# Patient Record
Sex: FEMALE | Race: WHITE | Employment: FULL TIME | URBAN - METROPOLITAN AREA
[De-identification: names, ages, dates, MRNs, and addresses within clinical notes are randomized per-mention and may not be internally consistent; named-entity substitution may affect disease eponyms.]

---

## 2024-09-27 ENCOUNTER — APPOINTMENT (OUTPATIENT)
Dept: GENERAL RADIOLOGY | Facility: HOSPITAL | Age: 29
End: 2024-09-27
Attending: EMERGENCY MEDICINE

## 2024-09-27 ENCOUNTER — HOSPITAL ENCOUNTER (EMERGENCY)
Facility: HOSPITAL | Age: 29
Discharge: HOME | End: 2024-09-28
Attending: EMERGENCY MEDICINE

## 2024-09-27 VITALS
RESPIRATION RATE: 18 BRPM | TEMPERATURE: 98 F | SYSTOLIC BLOOD PRESSURE: 101 MMHG | HEART RATE: 82 BPM | DIASTOLIC BLOOD PRESSURE: 69 MMHG | OXYGEN SATURATION: 99 % | WEIGHT: 141.13 LBS

## 2024-09-27 DIAGNOSIS — S89.92XA LEFT KNEE INJURY, INITIAL ENCOUNTER: Primary | ICD-10-CM

## 2024-09-27 PROCEDURE — 99284 EMERGENCY DEPT VISIT MOD MDM: CPT

## 2024-09-27 PROCEDURE — 99283 EMERGENCY DEPT VISIT LOW MDM: CPT

## 2024-09-27 PROCEDURE — 73560 X-RAY EXAM OF KNEE 1 OR 2: CPT | Performed by: EMERGENCY MEDICINE

## 2024-09-27 RX ORDER — KETOROLAC TROMETHAMINE 30 MG/ML
30 INJECTION, SOLUTION INTRAMUSCULAR; INTRAVENOUS ONCE
Status: DISCONTINUED | OUTPATIENT
Start: 2024-09-27 | End: 2024-09-28

## 2024-09-27 RX ORDER — KETOROLAC TROMETHAMINE 10 MG/1
10 TABLET, FILM COATED ORAL EVERY 6 HOURS PRN
Qty: 20 TABLET | Refills: 0 | Status: SHIPPED | OUTPATIENT
Start: 2024-09-27 | End: 2024-10-02

## 2024-09-27 RX ORDER — LIDOCAINE 50 MG/G
1 PATCH TOPICAL EVERY 24 HOURS
Qty: 7 PATCH | Refills: 0 | Status: SHIPPED | OUTPATIENT
Start: 2024-09-27 | End: 2024-10-04

## 2024-09-28 NOTE — ED QUICK NOTES
Patient presents to ED Pod 3 encinas from triage with c/o tripping and twisting left knee. Denies hitting head or LOC. Patient is A&O x 4. German speaking. No distress noted. Respirations regular and unlabored. Call light at reach.

## 2024-09-28 NOTE — ED QUICK NOTES
Patient A&O x 4. No distress noted. Respirations regular and unlabored. All questions answered, along with all ED orders completed.   Patient instructed to return back to ED with any new onset of severe symptoms and to follow up with PCP or recommended specialist as directed.      used for discharge.

## 2024-09-28 NOTE — ED PROVIDER NOTES
Patient Seen in: [facility] Emergency Department    History     Chief Complaint   Patient presents with    Leg or Foot Injury     Stated Complaint: Knee Injury     HPI    30 yo F without PMH presenting with left lateral knee pain s/p trip/twisting of left knee. No direct trauma. No other traumatic/extremity complaints, no meds PTA.    History reviewed. No pertinent past medical history.    No past surgical history on file.         History reviewed. No pertinent family history.         Review of Systems :  Constitutional: As per HPI  Musculoskeletal: (+) left knee pain.    Positive for stated complaint: Knee Injury  Other systems are as noted in HPI.  Constitutional and vital signs reviewed.      All other systems reviewed and negative except as noted above.    PSFH elements reviewed from today and agreed except as otherwise stated in HPI.    Physical Exam     ED Triage Vitals [09/27/24 2113]   /69   Pulse 82   Resp 18   Temp 98.2 °F (36.8 °C)   Temp src Temporal   SpO2 99 %   O2 Device        Current:/69   Pulse 82   Temp 98.2 °F (36.8 °C) (Temporal)   Resp 18   Wt 64 kg   SpO2 99%         Physical Exam   Constitutional: No distress.   HEENT: MMM.  Head: Normocephalic.   Eyes: No injection.   Cardiovascular: LLE with 2+ DP/PT pulses.   Pulmonary/Chest: Effort normal.   Musculoskeletal: No gross deformity. LLE with mild anterolateral knee tenderness without crepitance/obvoius laxiety or cutaneous change with soft compartments and with negative Lever sign.  Neurological: Alert. LLE with 5/5 strength proximally and distally.  Skin: Skin is warm.   Psychiatric: Cooperative.  Nursing note and vitals reviewed.        ED Course   Labs Reviewed - No data to display  Preliminary Radiology Report  [facility], [facility]  [phone] - Phone    [facility]    NAME: ISABEL GASTELUM    DATE OF EXAM: 09/27/2024  Patient No:  [id number]  Physician:  YADIRA^BHARATH ^2  Date of Birth:   03/22/1995    Past Medical History (entered by Technologist):    Reason For Exam (entered by Technologist):  Tripping and twisting left knee today.  Other Notes (entered by Technologist): Room 39HA. Pod 3.    Additional Information (per [facility]):      LEFT KNEE RADIOGRAPHS    No prior    IMPRESSION:  No acute fracture or malalignment.  No destructive osseous lesion.        Report sent at 12:09 AM ET    Crystal Pratt MD  This report has been electronically signed and verified by the Radiologist whose name is printed above.  ED Course as of 09/28/24 0748  ------------------------------------------------------------  Time: 09/27 2188  Comment: rest       MDM   DIFFERENTIAL DIAGNOSIS: After history and physical exam differential diagnosis includes but is not limited to fracture, meniscal/ligamentous injury.    Pulse ox: 99%:Normal on RA, as independently interpreted by myself    Medical Decision Making  Evaluation for left anterolateral knee tenderness without neurovascular compromise/crepitance - XR nonacute, stable for discharge with symptomatic care/knee immobilizer and PCP/ortho referrals for followup.    Problems Addressed:  Left knee injury, initial encounter: acute illness or injury    Amount and/or Complexity of Data Reviewed  Radiology: ordered and independent interpretation performed. Decision-making details documented in ED Course.     Details: XR without obvious dislocation as independently interpreted by myself    Risk  OTC drugs.  Prescription drug management.      I was wearing at minimum a facemask and eye protection throughout this encounter with handwashing performed prior and after patient evaluation without personal hand/facial/oropharyngeal contact and gloves worn throughout encounter. See note and/or contact this provider for further PPE details.    Disposition and Plan     Clinical Impression:  1. Left knee injury, initial encounter         Disposition:  Discharge    Follow-up:  Amos Mclaughlin MD  [address] [facility] [address]  [facility] [id number] [phone]    Call  For orthopedic followup and re-evaluation.    Beverly Cortes MD  [address] [facility] [address] [phone]    Call  For followup and re-evaluation.      Medications Prescribed:  Discharge Medication List as of 9/27/2024 11:52 PM        START taking these medications    Details   Ketorolac Tromethamine 10 MG Oral Tab Take 1 tablet (10 mg total) by mouth every 6 (six) hours as needed for Pain., Print, Disp-20 tablet, R-0      diclofenac 1 % External Gel Apply 2 g topically 4 (four) times daily as needed., Print, Disp-100 g, R-0      lidocaine 5 % External Patch Place 1 patch onto the skin daily for 7 days. Apply to skin for 12 hours at a time, then remove for next 12 hours. Do not apply over broken/irritated skin., Print, Disp-7 patch, R-0

## 2024-09-30 ENCOUNTER — HOSPITAL ENCOUNTER (EMERGENCY)
Facility: HOSPITAL | Age: 29
Discharge: HOME | End: 2024-09-30
Attending: EMERGENCY MEDICINE

## 2024-09-30 VITALS
OXYGEN SATURATION: 98 % | SYSTOLIC BLOOD PRESSURE: 94 MMHG | RESPIRATION RATE: 18 BRPM | HEART RATE: 88 BPM | DIASTOLIC BLOOD PRESSURE: 61 MMHG | TEMPERATURE: 98 F

## 2024-09-30 DIAGNOSIS — S83.92XA SPRAIN OF LEFT KNEE, UNSPECIFIED LIGAMENT, INITIAL ENCOUNTER: Primary | ICD-10-CM

## 2024-09-30 PROCEDURE — 99282 EMERGENCY DEPT VISIT SF MDM: CPT

## 2024-09-30 PROCEDURE — 99283 EMERGENCY DEPT VISIT LOW MDM: CPT

## 2024-09-30 NOTE — ED QUICK NOTES
Discharge instructions given to pt. Pt verbalized understanding of home care, and to follow up with specialist. Pt denied further questions or concerns.

## 2024-09-30 NOTE — ED INITIAL ASSESSMENT (HPI)
Pt here for left knee pain that has gotten worse since Friday when she was seen here. Pt states she still has a lot of pain when she is standing. Her job consists of standing so she wants more days off work. Pt did not call to make an appointment with the specialist yet. States she doesn't know how. Pt taking the pain medication she was prescribed.

## 2024-09-30 NOTE — ED PROVIDER NOTES
Patient Seen in: [facility] Emergency Department    History     Chief Complaint   Patient presents with    Leg or Foot Injury     Stated Complaint: Leg injury    HPI    HPI: Kym Grubbs is a 29 year old female who presents after an injury to the l knee that occurred a few days kimberly . Patient complains 8/10 pain.  Pain better with rest, worse with movement.  no loss of strengths or sensation. Was seen had xray, but can't bear weight    History reviewed. No pertinent past medical history.    History reviewed. No pertinent surgical history.         No family history on file.    Social History     Socioeconomic History    Marital status: Single   Tobacco Use    Smoking status: Never    Smokeless tobacco: Never   Vaping Use    Vaping status: Never Used   Substance and Sexual Activity    Alcohol use: Not Currently    Drug use: Never       Review of Systems    Positive for stated complaint: Leg injury  Other systems are as noted in HPI.  Constitutional and vital signs reviewed.      All other systems reviewed and negative except as noted above.    PSFH elements reviewed from today and agreed except as otherwise stated in HPI.    Physical Exam     ED Triage Vitals [09/30/24 0915]   BP 94/61   Pulse 88   Resp 18   Temp 98 °F (36.7 °C)   Temp src Oral   SpO2 98 %   O2 Device None (Room air)       Current:BP 94/61   Pulse 88   Temp 98 °F (36.7 °C) (Oral)   Resp 18   LMP 09/15/2024 (Approximate)   SpO2 98%         Physical Exam      MENTAL STATUS: Alert, oriented, and cooperative. No focal deficit  HEAD: Atraumatic  NECK: Supple, full range of motion without pain or paresthesias  EXTREMITIES: l knee with sts, no sig effusion ,no erythema, distal nvs intact   NEURO:Sensation to touch is intact.  SKIN: No open wounds, no rashes.  PSYCH: Normal affect. Calm and cooperative.    Differential diagnosis to include fracture vs. Strain/sprain vs. contusion    ED Course   Labs Reviewed - No data to display    MDM        Medical Decision Making  Problems Addressed:  Sprain of left knee, unspecified ligament, initial encounter: acute illness or injury    Amount and/or Complexity of Data Reviewed  External Data Reviewed: radiology.     Details: Reviewed xray fro mthe other day.  Will give crutches to help alleviate the pain.  Discussion of management or test interpretation with external provider(s): Tylenol, motrin recommended.      Risk  OTC drugs.        Disposition and Plan     Clinical Impression:  1. Sprain of left knee, unspecified ligament, initial encounter        Disposition:  Discharge    Follow-up:  Amos Mclaughlin MD  [address] [place] [id number]  [phone]    Follow up        Medications Prescribed:  Discharge Medication List as of 9/30/2024 11:07 AM

## (undated) NOTE — LETTER
Date & Time: 9/27/2024, 11:58 PM  Patient: Kym Grubbs  Encounter Provider(s):    Joao Johnson MD       To Whom It May Concern:    Kym Grubbs was seen and treated in our department on 9/27/2024. She should not return to work until 09/30/24 .    If you have any questions or concerns, please do not hesitate to call.        _____________________________  Physician/APC Signature

## (undated) NOTE — LETTER
Date & Time: 9/30/2024, 10:57 AM  Patient: Kym Grubbs  Encounter Provider(s):    Ruperto Hebert MD       To Whom It May Concern:    Kym Grubbs was seen and treated in our department on 9/30/2024. She is excused from work for 8 days.    If you have any questions or concerns, please do not hesitate to call.        _____________________________  Physician/APC Signature